# Patient Record
Sex: MALE | Race: WHITE
[De-identification: names, ages, dates, MRNs, and addresses within clinical notes are randomized per-mention and may not be internally consistent; named-entity substitution may affect disease eponyms.]

---

## 2021-04-30 PROBLEM — Z00.00 ENCOUNTER FOR PREVENTIVE HEALTH EXAMINATION: Status: ACTIVE | Noted: 2021-04-30

## 2021-05-07 DIAGNOSIS — Z83.3 FAMILY HISTORY OF DIABETES MELLITUS: ICD-10-CM

## 2021-05-07 DIAGNOSIS — H90.5 UNSPECIFIED SENSORINEURAL HEARING LOSS: ICD-10-CM

## 2021-05-07 DIAGNOSIS — Z82.0 FAMILY HISTORY OF EPILEPSY AND OTHER DISEASES OF THE NERVOUS SYSTEM: ICD-10-CM

## 2021-05-07 DIAGNOSIS — Z86.39 PERSONAL HISTORY OF OTHER ENDOCRINE, NUTRITIONAL AND METABOLIC DISEASE: ICD-10-CM

## 2021-05-07 DIAGNOSIS — Z86.2 PERSONAL HISTORY OF DISEASES OF THE BLOOD AND BLOOD-FORMING ORGANS AND CERTAIN DISORDERS INVOLVING THE IMMUNE MECHANISM: ICD-10-CM

## 2021-05-07 DIAGNOSIS — Z82.49 FAMILY HISTORY OF ISCHEMIC HEART DISEASE AND OTHER DISEASES OF THE CIRCULATORY SYSTEM: ICD-10-CM

## 2021-05-07 DIAGNOSIS — Z86.79 PERSONAL HISTORY OF OTHER DISEASES OF THE CIRCULATORY SYSTEM: ICD-10-CM

## 2021-05-07 DIAGNOSIS — Z86.010 PERSONAL HISTORY OF COLONIC POLYPS: ICD-10-CM

## 2021-05-07 RX ORDER — METFORMIN HYDROCHLORIDE 500 MG/1
500 TABLET, FILM COATED ORAL
Refills: 0 | Status: ACTIVE | COMMUNITY

## 2021-05-07 RX ORDER — RAMIPRIL 10 MG/1
10 CAPSULE ORAL
Refills: 0 | Status: ACTIVE | COMMUNITY

## 2021-05-10 ENCOUNTER — APPOINTMENT (OUTPATIENT)
Dept: HEMATOLOGY ONCOLOGY | Facility: CLINIC | Age: 66
End: 2021-05-10
Payer: COMMERCIAL

## 2021-05-10 VITALS
HEIGHT: 72 IN | TEMPERATURE: 97.8 F | HEART RATE: 66 BPM | SYSTOLIC BLOOD PRESSURE: 150 MMHG | BODY MASS INDEX: 28.31 KG/M2 | DIASTOLIC BLOOD PRESSURE: 82 MMHG | WEIGHT: 209 LBS | RESPIRATION RATE: 18 BRPM | OXYGEN SATURATION: 96 %

## 2021-05-10 DIAGNOSIS — Z78.9 OTHER SPECIFIED HEALTH STATUS: ICD-10-CM

## 2021-05-10 DIAGNOSIS — Z85.46 PERSONAL HISTORY OF MALIGNANT NEOPLASM OF PROSTATE: ICD-10-CM

## 2021-05-10 DIAGNOSIS — Z72.89 OTHER PROBLEMS RELATED TO LIFESTYLE: ICD-10-CM

## 2021-05-10 DIAGNOSIS — Z87.891 PERSONAL HISTORY OF NICOTINE DEPENDENCE: ICD-10-CM

## 2021-05-10 PROCEDURE — 99205 OFFICE O/P NEW HI 60 MIN: CPT

## 2021-05-10 PROCEDURE — 99072 ADDL SUPL MATRL&STAF TM PHE: CPT

## 2021-05-10 NOTE — HISTORY OF PRESENT ILLNESS
[de-identified] : Mr. Shafer is a 65 year old male who is referred for initial consultation for thrombocytopenia. \par He was first told that his platelets may have been borderline based on blood work from 2007.  Platelet counts in Jan of 2021 were 143,000 and in April 136,000.  He deneis bleeding or easy bruising.  He has occasional episodes of feeling lightheaded and nauseas.   [0 - No Distress] : Distress Level: 0

## 2021-05-10 NOTE — ASSESSMENT
[FreeTextEntry1] : This appears to be a relatively chronic and progressive finding.\par He reports that blood work dating back to 2007 may have revealed a similar finding.\par I will obtain a CBC with differential and a peripheral blood smear review.\par I will obtain a complete hematological workup to include a CMP, LDH, haptoglobin, reticulocyte count, B12/MMA/folic acid levels, TSH, SPEP, SIEP, IgQ,serum free light chains, EDI,  RF, panel, ferritin level, and a Heather' test.\par At this time he appears to be asymptomatic without signs of bleeding or easy bruising.\par I am requesting records from his urologist regarding the status and treatment of his prostate cancer.\par The patient will return in 2-3 weeks for followup, review of the above workup, and further recommendations.\par \par Thank you very much for allowing me to participate in the care of this patient. Should you have any questions or concerns please do not hesitate to call me directly.

## 2021-06-03 ENCOUNTER — APPOINTMENT (OUTPATIENT)
Dept: HEMATOLOGY ONCOLOGY | Facility: CLINIC | Age: 66
End: 2021-06-03
Payer: COMMERCIAL

## 2021-06-03 VITALS
RESPIRATION RATE: 18 BRPM | OXYGEN SATURATION: 98 % | SYSTOLIC BLOOD PRESSURE: 125 MMHG | TEMPERATURE: 98.3 F | DIASTOLIC BLOOD PRESSURE: 82 MMHG | HEART RATE: 57 BPM

## 2021-06-03 PROCEDURE — 99214 OFFICE O/P EST MOD 30 MIN: CPT

## 2021-06-03 PROCEDURE — 99072 ADDL SUPL MATRL&STAF TM PHE: CPT

## 2021-06-03 NOTE — HISTORY OF PRESENT ILLNESS
[0 - No Distress] : Distress Level: 0 [de-identified] : Mr. Shafer is a 65 year old male who is referred for initial consultation for thrombocytopenia. \par He was first told that his platelets may have been borderline based on blood work from 2007.  Platelet counts in Jan of 2021 were 143,000 and in April 136,000.  He deneis bleeding or easy bruising.  He has occasional episodes of feeling lightheaded and nauseas.   [de-identified] : Offers no new complaints.

## 2021-06-03 NOTE — ASSESSMENT
[FreeTextEntry1] : This appears to be a relatively chronic and progressive finding.\par He reports that blood work dating back to 2007 may have revealed a similar finding.\par I obtained a CBC with differential and a peripheral blood smear review.\par I obtained a complete hematological workup that included a CMP, LDH, haptoglobin, reticulocyte count, B12/MMA/folic acid levels, TSH, SPEP, SIEP, IgQ,serum free light chains, EDI,  RF, panel, ferritin level, and a Heather' test.  This was mostly unremarkable except for a +EDI and a low level monoclonal IgG lambda protein.  At this time I do not see evidence of end organ dysfunction (CRAB symptoms) related to this protein.\par At this time he appears to be asymptomatic without signs of bleeding or easy bruising.\par I am requesting records from his urologist regarding the status and treatment of his prostate cancer.\par I am referring him to Rheum for an evaluation.\par He will return in 2 months for f/u and repeat labs.

## 2021-08-12 ENCOUNTER — APPOINTMENT (OUTPATIENT)
Dept: HEMATOLOGY ONCOLOGY | Facility: CLINIC | Age: 66
End: 2021-08-12
Payer: COMMERCIAL

## 2021-08-12 VITALS
HEIGHT: 72 IN | HEART RATE: 74 BPM | OXYGEN SATURATION: 98 % | BODY MASS INDEX: 26.28 KG/M2 | WEIGHT: 194 LBS | TEMPERATURE: 98 F | DIASTOLIC BLOOD PRESSURE: 72 MMHG | SYSTOLIC BLOOD PRESSURE: 110 MMHG | RESPIRATION RATE: 18 BRPM

## 2021-08-12 PROCEDURE — 99214 OFFICE O/P EST MOD 30 MIN: CPT

## 2021-08-24 LAB
ALBUMIN MFR SERPL ELPH: 60.9 %
ALBUMIN SERPL-MCNC: 3.8 G/DL
ALBUMIN/GLOB SERPL: 1.5 RATIO
ALPHA1 GLOB MFR SERPL ELPH: 3.5 %
ALPHA1 GLOB SERPL ELPH-MCNC: 0.2 G/DL
ALPHA2 GLOB MFR SERPL ELPH: 12 %
ALPHA2 GLOB SERPL ELPH-MCNC: 0.8 G/DL
B-GLOBULIN MFR SERPL ELPH: 11.4 %
B-GLOBULIN SERPL ELPH-MCNC: 0.7 G/DL
DEPRECATED KAPPA LC FREE/LAMBDA SER: 0.39 RATIO
DEPRECATED KAPPA LC FREE/LAMBDA SER: 0.39 RATIO
GAMMA GLOB FLD ELPH-MCNC: 0.8 G/DL
GAMMA GLOB MFR SERPL ELPH: 12.2 %
IGA SER QL IEP: 195 MG/DL
IGG SER QL IEP: 852 MG/DL
IGM SER QL IEP: 29 MG/DL
INTERPRETATION SERPL IEP-IMP: NORMAL
KAPPA LC CSF-MCNC: 2.75 MG/DL
KAPPA LC CSF-MCNC: 2.75 MG/DL
KAPPA LC SERPL-MCNC: 1.07 MG/DL
KAPPA LC SERPL-MCNC: 1.07 MG/DL
M PROTEIN MFR SERPL ELPH: 2.8 %
M PROTEIN SPEC IFE-MCNC: NORMAL
MONOCLON BAND OBS SERPL: 0.2 G/DL
PROT SERPL-MCNC: 6.3 G/DL
PROT SERPL-MCNC: 6.3 G/DL

## 2021-08-24 NOTE — HISTORY OF PRESENT ILLNESS
[0 - No Distress] : Distress Level: 0 [de-identified] : Mr. Shafer is a 65 year old male who is referred for initial consultation for thrombocytopenia. \par He was first told that his platelets may have been borderline based on blood work from 2007.  Platelet counts in Jan of 2021 were 143,000 and in April 136,000.  He deneis bleeding or easy bruising.  He has occasional episodes of feeling lightheaded and nauseas.   [de-identified] : He presents for f/up of mild thrombocytopenia and MGUS. he was recently diagnosed with thyroid cancer and will be  going to see  for thyroidectomy. He is unsure of the histology.  His PSA has also been elevated and he underwent prostate biopsy last year with one pos core, recommended observation with repeat. He denies rash, fever, infections, bleeding, bruising, nausea,vomiting,cough, SOB, chest pain, change in BM, headache or dizziness.

## 2021-08-24 NOTE — ASSESSMENT
[FreeTextEntry1] : This appears to be a relatively chronic and progressive finding.\par He reports that blood work dating back to 2007 may have revealed a similar finding.\par I obtained a CBC with differential and a peripheral blood smear review.\par I obtained a complete hematological workup that included a CMP, LDH, haptoglobin, reticulocyte count, B12/MMA/folic acid levels, TSH, SPEP, SIEP, IgQ,serum free light chains, EDI,  RF, panel, ferritin level, and a Heather' test.  This was mostly unremarkable except for a +EDI and a low level monoclonal IgG lambda protein.  At this time I do not see evidence of end organ dysfunction (CRAB symptoms) related to this protein.\par At this time he appears to be asymptomatic without signs of bleeding or easy bruising.\par Requested records from his urologist regarding the status and treatment of his prostate cancer.\par Will request results of thyroidectomy\par He continues to have mild thrombocytopenia without bleeding or bruising and an IgG Lambda MGUS. Otherwise his labs are normal.\par Continue routine, age-appropriate, healthcare maintenance \par History of present illness, review of systems, physical exam and treatment plan reviewed with .\par Office visit in 12 weeks or prn for new or worsening symptoms.

## 2021-11-17 ENCOUNTER — APPOINTMENT (OUTPATIENT)
Dept: HEMATOLOGY ONCOLOGY | Facility: CLINIC | Age: 66
End: 2021-11-17
Payer: COMMERCIAL

## 2021-11-17 VITALS
DIASTOLIC BLOOD PRESSURE: 78 MMHG | OXYGEN SATURATION: 98 % | RESPIRATION RATE: 18 BRPM | TEMPERATURE: 99.1 F | SYSTOLIC BLOOD PRESSURE: 145 MMHG | HEIGHT: 72 IN | HEART RATE: 68 BPM | WEIGHT: 190 LBS | BODY MASS INDEX: 25.73 KG/M2

## 2021-11-17 PROCEDURE — 99214 OFFICE O/P EST MOD 30 MIN: CPT

## 2021-11-17 NOTE — ASSESSMENT
[FreeTextEntry1] : This appears to be a relatively chronic and progressive finding.\par He reports that blood work dating back to 2007 may have revealed a similar finding.\par I obtained a CBC with differential and a peripheral blood smear review.\par I obtained a complete hematological workup that included a CMP, LDH, haptoglobin, reticulocyte count, B12/MMA/folic acid levels, TSH, SPEP, SIEP, IgQ,serum free light chains, EDI,  RF, panel, ferritin level, and a Heather' test.  This was mostly unremarkable except for a +EDI and a low level monoclonal IgG lambda protein.  At this time I do not see evidence of end organ dysfunction (CRAB symptoms) related to this protein.\par At this time he appears to be asymptomatic without signs of bleeding or easy bruising.\par I am requesting records from the time of his thyroid surgery and from his recent prostate biopsy.  He knows to continue to follow-up with Torrie Borjas, Peter, and Leonid.\par He was found to have mild thrombocytopenia without bleeding or bruising and an IgG Lambda MGUS. Otherwise his labs are normal.\par Continue routine, age-appropriate, healthcare maintenance \par Office visit in 12 weeks or prn for new or worsening symptoms.

## 2021-11-17 NOTE — HISTORY OF PRESENT ILLNESS
[de-identified] : Mr. Shafer is a 66 year old male who is referred for initial consultation for thrombocytopenia. \par He was first told that his platelets may have been borderline based on blood work from 2007.  Platelet counts in Jan of 2021 were 143,000 and in April 136,000.  He deneis bleeding or easy bruising.  He has occasional episodes of feeling lightheaded and nauseas.   [de-identified] : He presents for f/up of mild thrombocytopenia and MGUS. he was recently diagnosed with thyroid cancer and underwent a thyroidectomy .  He reports that he has been told by Dr. Borjas and Dr. Geren that he does not require CADET therapy.  His PSA has also been elevated and he underwent prostate biopsy last week with the results not available this time. He denies rash, fever, infections, bleeding, bruising, nausea,vomiting,cough, SOB, chest pain, change in BM, headache or dizziness.

## 2022-01-14 LAB — PSA SERPL-MCNC: 24.9 NG/ML

## 2022-01-20 ENCOUNTER — APPOINTMENT (OUTPATIENT)
Dept: HEMATOLOGY ONCOLOGY | Facility: CLINIC | Age: 67
End: 2022-01-20
Payer: COMMERCIAL

## 2022-01-24 ENCOUNTER — APPOINTMENT (OUTPATIENT)
Dept: HEMATOLOGY ONCOLOGY | Facility: CLINIC | Age: 67
End: 2022-01-24
Payer: COMMERCIAL

## 2022-01-24 VITALS
TEMPERATURE: 98.6 F | HEIGHT: 72 IN | OXYGEN SATURATION: 96 % | HEART RATE: 78 BPM | WEIGHT: 192 LBS | RESPIRATION RATE: 18 BRPM | DIASTOLIC BLOOD PRESSURE: 68 MMHG | BODY MASS INDEX: 26.01 KG/M2 | SYSTOLIC BLOOD PRESSURE: 126 MMHG

## 2022-01-24 PROCEDURE — 99214 OFFICE O/P EST MOD 30 MIN: CPT

## 2022-01-24 NOTE — HISTORY OF PRESENT ILLNESS
[de-identified] : Mr. Shafer is a 66 year old male who is referred for initial consultation for thrombocytopenia. \par He was first told that his platelets may have been borderline based on blood work from 2007.  Platelet counts in Jan of 2021 were 143,000 and in April 136,000.  He deneis bleeding or easy bruising.  He has occasional episodes of feeling lightheaded and nauseas.   [de-identified] : He presents for f/up of mild thrombocytopenia and MGUS. he was recently diagnosed with thyroid cancer and underwent a thyroidectomy .  He reports that he has been told by Dr. Borjas and Dr. Green that he does not require CADET therapy.  His PSA has also been elevated and he underwent prostate biopsy that revealed a prostate adenocarcinoma.  He underwent a radical prostatectomy on 1/4/2022 and unfortunately developed a DVT and a saddle embolus for which he was transferred to [facility].  He was treated symptomatically with Eliquis and reports having improved back to baseline.

## 2022-01-24 NOTE — ASSESSMENT
[FreeTextEntry1] : This appears to be a relatively chronic and progressive finding.\par He reports that blood work dating back to 2007 may have revealed a similar finding.\par I obtained a CBC with differential and a peripheral blood smear review.\par I obtained a complete hematological workup that included a CMP, LDH, haptoglobin, reticulocyte count, B12/MMA/folic acid levels, TSH, SPEP, SIEP, IgQ,serum free light chains, EDI,  RF, panel, ferritin level, and a Heather' test.  This was mostly unremarkable except for a +EDI and a low level monoclonal IgG lambda protein.  At this time I do not see evidence of end organ dysfunction (CRAB symptoms) related to this protein.\par At this time he appears to be asymptomatic without signs of bleeding or easy bruising.\par I am requesting records from the time of his radical prostatectomy including operative notes and pathology reports.  I am also requesting records from his hospitalization at [facility] for a DVT and saddle embolus.  He appears to be doing clinically well and continues on Eliquis.  Given the severity of this event I will obtain a complete hematological work-up for thrombophilia.  He will require a repeat CT angiography of the chest at some point later in his anticoagulation, likely closer to 6 months.\par Continue routine, age-appropriate, healthcare maintenance \par Office visit in 12 weeks or prn for new or worsening symptoms.

## 2022-02-14 ENCOUNTER — APPOINTMENT (OUTPATIENT)
Dept: HEMATOLOGY ONCOLOGY | Facility: CLINIC | Age: 67
End: 2022-02-14
Payer: COMMERCIAL

## 2022-02-14 VITALS
SYSTOLIC BLOOD PRESSURE: 139 MMHG | WEIGHT: 187 LBS | BODY MASS INDEX: 25.33 KG/M2 | TEMPERATURE: 98.4 F | DIASTOLIC BLOOD PRESSURE: 96 MMHG | HEIGHT: 72 IN | HEART RATE: 72 BPM | RESPIRATION RATE: 18 BRPM | OXYGEN SATURATION: 98 %

## 2022-02-14 PROCEDURE — 99214 OFFICE O/P EST MOD 30 MIN: CPT | Mod: 25

## 2022-02-14 PROCEDURE — 36415 COLL VENOUS BLD VENIPUNCTURE: CPT

## 2022-02-14 NOTE — HISTORY OF PRESENT ILLNESS
[0 - No Distress] : Distress Level: 0 [de-identified] : Mr. Shafer is a 66 year old male who is referred for initial consultation for thrombocytopenia. \par He was first told that his platelets may have been borderline based on blood work from 2007.  Platelet counts in Jan of 2021 were 143,000 and in April 136,000.  He deneis bleeding or easy bruising.  He has occasional episodes of feeling lightheaded and nauseas.   [de-identified] : He presents for f/up of mild thrombocytopenia, MGUS,thyroid and prostate cancers and saddle embolus.  He continues on Eliquis and presents to review thrombophilia work-up which was notable only for decreased protein S activity of 53% which may be due to consumption.  He also has a mild lambda MGUS.  Platelet count had normalized to 204,000 at last visit on January 24, 2022.  He was recently diagnosed with thyroid cancer and underwent a thyroidectomy with .  He reports that he has been told by Dr. Borjas and Dr. Green that he does not require CADET therapy.  His PSA has also been elevated and he underwent prostate biopsy that revealed a prostate adenocarcinoma.  He underwent a radical prostatectomy on 1/4/2022 and unfortunately developed a DVT and a saddle embolus for which he was transferred to [facility].  He was treated symptomatically with Eliquis and reports having improved back to baseline.

## 2022-02-14 NOTE — ASSESSMENT
[FreeTextEntry1] : This appears to be a relatively chronic and progressive finding.\par He reports that blood work dating back to 2007 may have revealed a similar finding.\par I obtained a CBC with differential and a peripheral blood smear review.\par I obtained a complete hematological workup that included a CMP, LDH, haptoglobin, reticulocyte count, B12/MMA/folic acid levels, TSH, SPEP, SIEP, IgQ,serum free light chains, EDI,  RF, panel, ferritin level, and a Heather' test.  This was mostly unremarkable except for a +EDI and a low level monoclonal IgG lambda protein.  At this time I do not see evidence of end organ dysfunction (CRAB symptoms) related to this protein.\par At this time he appears to be asymptomatic without signs of bleeding or easy bruising.\par I am requesting records from the time of his radical prostatectomy including operative notes and pathology reports.  I am also requesting records from his hospitalization at [facility] for a DVT and saddle embolus.  He appears to be doing clinically well and continues on Eliquis.  Given the severity of this event I will obtain a complete hematological work-up for thrombophilia.  He will require a repeat CT angiography of the chest at some point later in his anticoagulation, likely closer to 6 months.\par Thrombophilia work-up  was notable only for decreased protein S activity of 53% which may be due to consumption.  He also has a mild lambda MGUS.  Platelet count had normalized to 204,000\par Repeat CT angiography of the chest in July 2022.\par Continue Eliquis\par Continue routine, age-appropriate, healthcare maintenance \par History of present illness, review of systems, physical exam and treatment plan reviewed with .\par Office visit in 12 weeks or prn for new or worsening symptoms.

## 2022-04-21 ENCOUNTER — APPOINTMENT (OUTPATIENT)
Dept: HEMATOLOGY ONCOLOGY | Facility: CLINIC | Age: 67
End: 2022-04-21

## 2022-05-12 ENCOUNTER — LABORATORY RESULT (OUTPATIENT)
Age: 67
End: 2022-05-12

## 2022-05-12 ENCOUNTER — APPOINTMENT (OUTPATIENT)
Dept: HEMATOLOGY ONCOLOGY | Facility: CLINIC | Age: 67
End: 2022-05-12
Payer: COMMERCIAL

## 2022-05-12 VITALS
RESPIRATION RATE: 18 BRPM | SYSTOLIC BLOOD PRESSURE: 118 MMHG | DIASTOLIC BLOOD PRESSURE: 71 MMHG | HEART RATE: 56 BPM | BODY MASS INDEX: 26.82 KG/M2 | OXYGEN SATURATION: 98 % | WEIGHT: 198 LBS | TEMPERATURE: 98.2 F | HEIGHT: 72 IN

## 2022-05-12 LAB
ALBUMIN MFR SERPL ELPH: 58 %
ALBUMIN SERPL-MCNC: 3.7 G/DL
ALBUMIN/GLOB SERPL: 1.4 RATIO
ALPHA1 GLOB MFR SERPL ELPH: 3.9 %
ALPHA1 GLOB SERPL ELPH-MCNC: 0.2 G/DL
ALPHA2 GLOB MFR SERPL ELPH: 12.8 %
ALPHA2 GLOB SERPL ELPH-MCNC: 0.8 G/DL
APTT BLD: 29.6 SEC
AT III PPP CHRO-ACNC: 97 %
B-GLOBULIN MFR SERPL ELPH: 11.7 %
B-GLOBULIN SERPL ELPH-MCNC: 0.7 G/DL
B2 GLYCOPROT1 IGG SER-ACNC: <5 SGU
B2 GLYCOPROT1 IGM SER-ACNC: <5 SMU
CARDIOLIPIN AB SER IA-ACNC: NEGATIVE
CARDIOLIPIN IGM SER-MCNC: 11.5 MPL
CARDIOLIPIN IGM SER-MCNC: 5 GPL
DEPRECATED KAPPA LC FREE/LAMBDA SER: 0.53 RATIO
DEPRECATED KAPPA LC FREE/LAMBDA SER: 0.53 RATIO
DNA PLOIDY SPEC FC-IMP: NORMAL
GAMMA GLOB FLD ELPH-MCNC: 0.9 G/DL
GAMMA GLOB MFR SERPL ELPH: 13.6 %
IGA SER QL IEP: 206 MG/DL
IGG SER QL IEP: 974 MG/DL
IGM SER QL IEP: 64 MG/DL
INTERPRETATION SERPL IEP-IMP: NORMAL
KAPPA LC CSF-MCNC: 3.66 MG/DL
KAPPA LC CSF-MCNC: 3.66 MG/DL
KAPPA LC SERPL-MCNC: 1.93 MG/DL
KAPPA LC SERPL-MCNC: 1.93 MG/DL
M PROTEIN MFR SERPL ELPH: 2.2 %
M PROTEIN SPEC IFE-MCNC: NORMAL
MONOCLON BAND OBS SERPL: 0.1 G/DL
PROT C PPP CHRO-ACNC: 119 %
PROT S FREE PPP-ACNC: 53 %
PROT SERPL-MCNC: 6.4 G/DL
PROT SERPL-MCNC: 6.4 G/DL
PSA SERPL-MCNC: 0.12 NG/ML
PTR INTERP: NORMAL

## 2022-05-12 PROCEDURE — 99214 OFFICE O/P EST MOD 30 MIN: CPT

## 2022-05-12 NOTE — HISTORY OF PRESENT ILLNESS
[de-identified] : Mr. Shafer is a 66 year old male who is referred for initial consultation for thrombocytopenia. \par He was first told that his platelets may have been borderline based on blood work from 2007.  Platelet counts in Jan of 2021 were 143,000 and in April 136,000.  He deneis bleeding or easy bruising.  He has occasional episodes of feeling lightheaded and nauseas.   [de-identified] : He presents for f/up of mild thrombocytopenia, MGUS,thyroid and prostate cancers and saddle embolus.  He continues on Eliquis and presents to review thrombophilia work-up which was notable only for decreased protein S activity of 53% which may be due to consumption.  He also has a mild lambda MGUS.  Platelet count had normalized to 204,000 at last visit on January 24, 2022.  He was recently diagnosed with thyroid cancer and underwent a thyroidectomy with .  He reports that he has been told by Dr. Borjas and Dr. Green that he does not require CADET therapy.  His PSA has also been elevated and he underwent prostate biopsy that revealed a prostate adenocarcinoma.  He underwent a radical prostatectomy on 1/4/2022 and unfortunately developed a DVT and a saddle embolus for which he was transferred to [facility].  He was treated symptomatically with Eliquis and reports having improved back to baseline.

## 2022-05-12 NOTE — ASSESSMENT
[FreeTextEntry1] : This appears to be a relatively chronic and progressive finding.\par He reports that blood work dating back to 2007 may have revealed a similar finding.\par I obtained a CBC with differential and a peripheral blood smear review.\par I obtained a complete hematological workup that included a CMP, LDH, haptoglobin, reticulocyte count, B12/MMA/folic acid levels, TSH, SPEP, SIEP, IgQ,serum free light chains, EDI,  RF, panel, ferritin level, and a Heather' test.  This was mostly unremarkable except for a +EDI and a low level monoclonal IgG lambda protein.  At this time I do not see evidence of end organ dysfunction (CRAB symptoms) related to this protein.\par At this time he appears to be asymptomatic without signs of bleeding or easy bruising.\par I am requesting records from the time of his radical prostatectomy including operative notes and pathology reports.  I am also requesting records from his hospitalization at [facility] for a DVT and saddle embolus.  He appears to be doing clinically well and continues on Eliquis.  Given the severity of this event I will obtain a complete hematological work-up for thrombophilia.  He will require a repeat CT angiography of the chest at some point later in his anticoagulation, likely closer to 6 months.\par Thrombophilia work-up  was notable only for decreased protein S activity of 53% which may be due to consumption.  He also has a mild lambda MGUS.  Platelet count had normalized to 204,000\par Repeat CT angiography of the chest in July 2022.\par Continue Eliquis\par Continue routine, age-appropriate, healthcare maintenance \par Office visit in 12 weeks or prn for new or worsening symptoms.

## 2022-08-24 ENCOUNTER — APPOINTMENT (OUTPATIENT)
Dept: HEMATOLOGY ONCOLOGY | Facility: CLINIC | Age: 67
End: 2022-08-24

## 2022-08-24 ENCOUNTER — RESULT REVIEW (OUTPATIENT)
Age: 67
End: 2022-08-24

## 2022-08-24 VITALS
SYSTOLIC BLOOD PRESSURE: 124 MMHG | HEIGHT: 72 IN | HEART RATE: 65 BPM | TEMPERATURE: 97.8 F | RESPIRATION RATE: 18 BRPM | DIASTOLIC BLOOD PRESSURE: 76 MMHG | OXYGEN SATURATION: 98 % | BODY MASS INDEX: 26.28 KG/M2 | WEIGHT: 194 LBS

## 2022-08-24 PROCEDURE — 99214 OFFICE O/P EST MOD 30 MIN: CPT

## 2022-08-26 LAB
ALBUMIN MFR SERPL ELPH: 62.1 %
ALBUMIN MFR SERPL ELPH: 62.1 %
ALBUMIN MFR SERPL ELPH: 62.3 %
ALBUMIN SERPL-MCNC: 3.9 G/DL
ALBUMIN SERPL-MCNC: 4.2 G/DL
ALBUMIN SERPL-MCNC: 4.4 G/DL
ALBUMIN/GLOB SERPL: 1.7 RATIO
ALPHA1 GLOB MFR SERPL ELPH: 3.3 %
ALPHA1 GLOB MFR SERPL ELPH: 3.4 %
ALPHA1 GLOB MFR SERPL ELPH: 3.6 %
ALPHA1 GLOB SERPL ELPH-MCNC: 0.2 G/DL
ALPHA2 GLOB MFR SERPL ELPH: 10.9 %
ALPHA2 GLOB MFR SERPL ELPH: 11 %
ALPHA2 GLOB MFR SERPL ELPH: 11.1 %
ALPHA2 GLOB SERPL ELPH-MCNC: 0.7 G/DL
ALPHA2 GLOB SERPL ELPH-MCNC: 0.7 G/DL
ALPHA2 GLOB SERPL ELPH-MCNC: 0.8 G/DL
ANA PAT FLD IF-IMP: NORMAL
ANA SER IF-ACNC: ABNORMAL
B-GLOBULIN MFR SERPL ELPH: 11.2 %
B-GLOBULIN MFR SERPL ELPH: 11.2 %
B-GLOBULIN MFR SERPL ELPH: 11.7 %
B-GLOBULIN SERPL ELPH-MCNC: 0.7 G/DL
B-GLOBULIN SERPL ELPH-MCNC: 0.8 G/DL
B-GLOBULIN SERPL ELPH-MCNC: 0.8 G/DL
DEPRECATED KAPPA LC FREE/LAMBDA SER: 0.45 RATIO
DEPRECATED KAPPA LC FREE/LAMBDA SER: 0.45 RATIO
DEPRECATED KAPPA LC FREE/LAMBDA SER: 0.46 RATIO
DEPRECATED KAPPA LC FREE/LAMBDA SER: 0.53 RATIO
DEPRECATED KAPPA LC FREE/LAMBDA SER: 0.53 RATIO
ERYTHROCYTE [SEDIMENTATION RATE] IN BLOOD BY WESTERGREN METHOD: 18 MM/HR
FERRITIN SERPL-MCNC: 235 NG/ML
FERRITIN SERPL-MCNC: 394 NG/ML
FOLATE SERPL-MCNC: 10.6 NG/ML
FOLATE SERPL-MCNC: 6.9 NG/ML
GAMMA GLOB FLD ELPH-MCNC: 0.8 G/DL
GAMMA GLOB FLD ELPH-MCNC: 0.8 G/DL
GAMMA GLOB FLD ELPH-MCNC: 0.9 G/DL
GAMMA GLOB MFR SERPL ELPH: 11.7 %
GAMMA GLOB MFR SERPL ELPH: 12.2 %
GAMMA GLOB MFR SERPL ELPH: 12.2 %
HAPTOGLOB SERPL-MCNC: 152 MG/DL
HAPTOGLOB SERPL-MCNC: 164 MG/DL
IGA SER QL IEP: 161 MG/DL
IGA SER QL IEP: 172 MG/DL
IGA SER QL IEP: 173 MG/DL
IGA SER QL IEP: 194 MG/DL
IGG SER QL IEP: 805 MG/DL
IGG SER QL IEP: 849 MG/DL
IGG SER QL IEP: 911 MG/DL
IGG SER QL IEP: 912 MG/DL
IGM SER QL IEP: 31 MG/DL
IGM SER QL IEP: 31 MG/DL
IGM SER QL IEP: 36 MG/DL
IGM SER QL IEP: 95 MG/DL
INTERPRETATION SERPL IEP-IMP: NORMAL
IRON SATN MFR SERPL: 31 %
IRON SATN MFR SERPL: 35 %
IRON SERPL-MCNC: 101 UG/DL
IRON SERPL-MCNC: 105 UG/DL
KAPPA LC CSF-MCNC: 2.94 MG/DL
KAPPA LC CSF-MCNC: 2.94 MG/DL
KAPPA LC CSF-MCNC: 3.01 MG/DL
KAPPA LC CSF-MCNC: 3.01 MG/DL
KAPPA LC CSF-MCNC: 3.05 MG/DL
KAPPA LC CSF-MCNC: 3.05 MG/DL
KAPPA LC CSF-MCNC: 3.14 MG/DL
KAPPA LC CSF-MCNC: 3.14 MG/DL
KAPPA LC SERPL-MCNC: 1.32 MG/DL
KAPPA LC SERPL-MCNC: 1.32 MG/DL
KAPPA LC SERPL-MCNC: 1.39 MG/DL
KAPPA LC SERPL-MCNC: 1.39 MG/DL
KAPPA LC SERPL-MCNC: 1.43 MG/DL
KAPPA LC SERPL-MCNC: 1.43 MG/DL
KAPPA LC SERPL-MCNC: 1.62 MG/DL
KAPPA LC SERPL-MCNC: 1.62 MG/DL
M PROTEIN MFR SERPL ELPH: 1.5 %
M PROTEIN MFR SERPL ELPH: 2.5 %
M PROTEIN MFR SERPL ELPH: 2.7 %
M PROTEIN SPEC IFE-MCNC: NORMAL
METHYLMALONATE SERPL-SCNC: 210 NMOL/L
MONOCLON BAND OBS SERPL: 0.1 G/DL
MONOCLON BAND OBS SERPL: 0.2 G/DL
MONOCLON BAND OBS SERPL: 0.2 G/DL
PROT S FREE PPP-ACNC: 61 %
PROT SERPL-MCNC: 6.2 G/DL
PROT SERPL-MCNC: 6.2 G/DL
PROT SERPL-MCNC: 6.7 G/DL
PROT SERPL-MCNC: 6.7 G/DL
PROT SERPL-MCNC: 7 G/DL
PROT SERPL-MCNC: 7 G/DL
RBC # BLD: 4.74 M/UL
RETICS # AUTO: 1.1 %
RETICS AGGREG/RBC NFR: 50.2 K/UL
RHEUMATOID FACT SER QL: <10 IU/ML
TIBC SERPL-MCNC: 288 UG/DL
TIBC SERPL-MCNC: 334 UG/DL
UIBC SERPL-MCNC: 187 UG/DL
UIBC SERPL-MCNC: 229 UG/DL
VIT B12 SERPL-MCNC: 300 PG/ML
VIT B12 SERPL-MCNC: 750 PG/ML

## 2022-08-26 NOTE — ASSESSMENT
[FreeTextEntry1] : This appears to be a relatively chronic and progressive finding.\par He reports that blood work dating back to 2007 may have revealed a similar finding.\par I obtained a CBC with differential and a peripheral blood smear review.\par I obtained a complete hematological workup that included a CMP, LDH, haptoglobin, reticulocyte count, B12/MMA/folic acid levels, TSH, SPEP, SIEP, IgQ,serum free light chains, EDI,  RF, panel, ferritin level, and a Heather' test.  This was mostly unremarkable except for a +EDI and a low level monoclonal IgG lambda protein.  At this time I do not see evidence of end organ dysfunction (CRAB symptoms) related to this protein.\par At this time he appears to be asymptomatic without signs of bleeding or easy bruising.\par I am requesting records from the time of his radical prostatectomy including operative notes and pathology reports. I am also requesting records from his hospitalization at [facility] for a DVT and saddle embolus.  He appears to be doing clinically well and continues on Eliquis.  Given the severity of this event I will obtain a complete hematological work-up for thrombophilia.  He will require a repeat CT angiography of the chest at some point later in his anticoagulation, likely closer to 6 months.\par Thrombophilia work-up  was notable only for decreased protein S activity of 53% which may be due to consumption.  He also has a mild lambda MGUS.  Platelet count had normalized to 204,000\par Continue Eliquis\par Continue routine, age-appropriate, healthcare maintenance \par Office visit in 12 weeks or prn for new or worsening symptoms.

## 2022-09-04 LAB
ALBUMIN MFR SERPL ELPH: 62.7 %
ALBUMIN SERPL-MCNC: 4.3 G/DL
ALBUMIN/GLOB SERPL: 1.7 RATIO
ALPHA1 GLOB MFR SERPL ELPH: 3.3 %
ALPHA1 GLOB SERPL ELPH-MCNC: 0.2 G/DL
ALPHA2 GLOB MFR SERPL ELPH: 10.6 %
ALPHA2 GLOB SERPL ELPH-MCNC: 0.7 G/DL
B-GLOBULIN MFR SERPL ELPH: 11.5 %
B-GLOBULIN SERPL ELPH-MCNC: 0.8 G/DL
GAMMA GLOB FLD ELPH-MCNC: 0.8 G/DL
GAMMA GLOB MFR SERPL ELPH: 11.9 %
INTERPRETATION SERPL IEP-IMP: NORMAL
M PROTEIN MFR SERPL ELPH: 2.8 %
M PROTEIN SPEC IFE-MCNC: NORMAL
METHYLMALONATE SERPL-SCNC: 183 NMOL/L
MONOCLON BAND OBS SERPL: 0.2 G/DL
PROT SERPL-MCNC: 6.8 G/DL
PROT SERPL-MCNC: 6.8 G/DL

## 2022-10-19 ENCOUNTER — APPOINTMENT (OUTPATIENT)
Dept: HEMATOLOGY ONCOLOGY | Facility: CLINIC | Age: 67
End: 2022-10-19

## 2022-10-19 ENCOUNTER — RESULT REVIEW (OUTPATIENT)
Age: 67
End: 2022-10-19

## 2022-10-19 VITALS
BODY MASS INDEX: 26.95 KG/M2 | TEMPERATURE: 98 F | OXYGEN SATURATION: 97 % | WEIGHT: 199 LBS | HEIGHT: 72 IN | DIASTOLIC BLOOD PRESSURE: 66 MMHG | SYSTOLIC BLOOD PRESSURE: 125 MMHG | RESPIRATION RATE: 18 BRPM | HEART RATE: 84 BPM

## 2022-10-19 PROCEDURE — 99214 OFFICE O/P EST MOD 30 MIN: CPT

## 2022-10-24 LAB
DEPRECATED KAPPA LC FREE/LAMBDA SER: 0.54 RATIO
DEPRECATED KAPPA LC FREE/LAMBDA SER: 0.54 RATIO
IGA SER QL IEP: 151 MG/DL
IGG SER QL IEP: 781 MG/DL
IGM SER QL IEP: 30 MG/DL
KAPPA LC CSF-MCNC: 2.95 MG/DL
KAPPA LC CSF-MCNC: 2.95 MG/DL
KAPPA LC SERPL-MCNC: 1.6 MG/DL
KAPPA LC SERPL-MCNC: 1.6 MG/DL
PROT S FREE PPP-ACNC: 49 %
PSA SERPL-MCNC: <0.01 NG/ML

## 2022-10-24 NOTE — ASSESSMENT
[FreeTextEntry1] : This appears to be a relatively chronic and progressive finding.\par He reports that blood work dating back to 2007 may have revealed a similar finding.\par I obtained a CBC with differential and a peripheral blood smear review.\par I obtained a complete hematological workup that included a CMP, LDH, haptoglobin, reticulocyte count, B12/MMA/folic acid levels, TSH, SPEP, SIEP, IgQ,serum free light chains, EDI,  RF, panel, ferritin level, and a Heather' test.  This was mostly unremarkable except for a +EDI and a low level monoclonal IgG lambda protein.  At this time I do not see evidence of end organ dysfunction (CRAB symptoms) related to this protein.\par At this time he appears to be asymptomatic without signs of bleeding or easy bruising.\par I am requesting records from the time of his radical prostatectomy including operative notes and pathology reports. I am also requesting records from his hospitalization at [facility] for a DVT and saddle embolus.  He appears to be doing clinically well and continues on Eliquis.  Given the severity of this event I will obtain a complete hematological work-up for thrombophilia.  He will require a repeat CT angiography of the chest at some point later in his anticoagulation, likely closer to 6 months.\par Thrombophilia work-up  was notable only for decreased protein S activity of 53% which may be due to consumption.  He also has a mild lambda MGUS.  \par Platelet count today is 136,000 without any bleeding or bruising\par In light of provoked clot, no thrombophilia except slightly diminished protein S activity, negative CT angio prostate and thyroid cancer is under control he was advised that he may discontinue Eliquis after he completes his current prescription.  He was given precautions regarding travel, leg pain and shortness of breath.\par Continue routine, age-appropriate, healthcare maintenance \par History of present illness, review of systems, physical exam and treatment plan reviewed with Dr. Sultana Crum\par Office visit in 12 weeks or prn for new or worsening symptoms.

## 2022-10-24 NOTE — HISTORY OF PRESENT ILLNESS
[0 - No Distress] : Distress Level: 0 [de-identified] : Mr. Shafer is a 66 year old male who is referred for initial consultation for thrombocytopenia. \par He was first told that his platelets may have been borderline based on blood work from 2007.  Platelet counts in Jan of 2021 were 143,000 and in April 136,000.  He deneis bleeding or easy bruising.  He has occasional episodes of feeling lightheaded and nauseas.   [de-identified] : He presents for f/up of mild thrombocytopenia, MGUS,thyroid and prostate cancers and saddle embolus in the setting of prostatectomy.  He continues on Eliquis 5 mg and thrombophilia work-up reveals only a slightly diminished protein S activity.  He was recently diagnosed with thyroid cancer and underwent a thyroidectomy with .  He reports that he has been told by Dr. Borjas and Dr. Green that he does not require CADET therapy.  His PSA has also been elevated and he underwent prostate biopsy that revealed a prostate adenocarcinoma.  He underwent a radical prostatectomy on 1/4/2022 and unfortunately developed a DVT and a saddle embolus for which he was transferred to [facility].  He was treated symptomatically with Eliquis and reports having improved back to baseline.

## 2023-02-23 ENCOUNTER — APPOINTMENT (OUTPATIENT)
Dept: HEMATOLOGY ONCOLOGY | Facility: CLINIC | Age: 68
End: 2023-02-23
Payer: COMMERCIAL

## 2023-02-23 ENCOUNTER — RESULT REVIEW (OUTPATIENT)
Age: 68
End: 2023-02-23

## 2023-02-23 VITALS
RESPIRATION RATE: 18 BRPM | HEART RATE: 55 BPM | DIASTOLIC BLOOD PRESSURE: 77 MMHG | HEIGHT: 72 IN | SYSTOLIC BLOOD PRESSURE: 132 MMHG | WEIGHT: 195 LBS | TEMPERATURE: 98.2 F | OXYGEN SATURATION: 99 % | BODY MASS INDEX: 26.41 KG/M2

## 2023-02-23 LAB — M PROTEIN SPEC IFE-MCNC: NORMAL

## 2023-02-23 PROCEDURE — 99214 OFFICE O/P EST MOD 30 MIN: CPT | Mod: 25

## 2023-02-23 PROCEDURE — 36415 COLL VENOUS BLD VENIPUNCTURE: CPT

## 2023-02-23 RX ORDER — IBUPROFEN 800 MG
TABLET ORAL
Refills: 0 | Status: COMPLETED | COMMUNITY
End: 2023-02-23

## 2023-02-23 RX ORDER — APIXABAN 5 MG/1
5 TABLET, FILM COATED ORAL
Refills: 0 | Status: COMPLETED | COMMUNITY
End: 2023-02-23

## 2023-02-23 RX ORDER — KRILL/OM-3/DHA/EPA/PHOSPHO/AST 1000-230MG
CAPSULE ORAL
Refills: 0 | Status: ACTIVE | COMMUNITY

## 2023-02-23 RX ORDER — ATORVASTATIN CALCIUM 20 MG/1
20 TABLET, FILM COATED ORAL
Refills: 0 | Status: ACTIVE | COMMUNITY

## 2023-02-23 RX ORDER — APIXABAN 5 MG/1
5 TABLET, FILM COATED ORAL
Qty: 189 | Refills: 0 | Status: COMPLETED | COMMUNITY
Start: 2022-02-14 | End: 2023-02-23

## 2023-02-23 NOTE — HISTORY OF PRESENT ILLNESS
[0 - No Distress] : Distress Level: 0 [de-identified] : Mr. Shafer is a 66 year old male who is referred for initial consultation for thrombocytopenia. \par He was first told that his platelets may have been borderline based on blood work from 2007.  Platelet counts in Jan of 2021 were 143,000 and in April 136,000.  \par He is folllowed for mild thrombocytopenia, MGUS,thyroid and prostate cancers and saddle embolus in the setting of prostatectomy.\par   He  underwent a thyroidectomy with  9/21\par PSA elevated and he underwent prostate biopsy that revealed a prostate adenocarcinoma.  He underwent a radical prostatectomy on 1/4/2022 and unfortunately developed a DVT and a saddle embolus for which he was transferred to [facility].  He did eliquis and stopped in 11/22 [de-identified] : He presents for f/up of mild thrombocytopenia, MGUS,thyroid and prostate cancers and saddle embolus in the setting of prostatectomy.\par   He  underwent a thyroidectomy with  9/21\par PSA elevated and he underwent prostate biopsy that revealed a prostate adenocarcinoma.  He underwent a radical prostatectomy on 1/4/2022 and unfortunately developed a DVT and a saddle embolus for which he was transferred to [facility].  He did eliquis and stopped in 11/22\par \par

## 2023-02-23 NOTE — ASSESSMENT
[FreeTextEntry1] : Mr. Shafer is a 66 year old male who is referred for initial consultation for thrombocytopenia. \par He was first told that his platelets may have been borderline based on blood work from 2007.  Platelet counts in Jan of 2021 were 143,000 and in April 136,000.  \par He is folllowed for mild thrombocytopenia, MGUS,thyroid and prostate cancers and saddle embolus in the setting of prostatectomy.\par   He  underwent a thyroidectomy with  9/21\par PSA elevated and he underwent prostate biopsy that revealed a prostate adenocarcinoma.  He underwent a radical prostatectomy on 1/4/2022 and unfortunately developed a DVT and a saddle embolus for which he was transferred to [facility].  He did eliquis and stopped in 11/22\par \par \par Plan:\par \par Prostate cancer: s/p radical prostatectomy 1/2022 --f/u with Dr. Jaquez for surveiilance\par Thyroid cancer s/p partial  thyroidectomy . f/u with Dr. Borjas\par throimbocytopenia : ? immune ? marrow pathology . Mild/stable\par Elevated monocytes 10% and absolute count > 500  -- if persistent > 3 months will need further w/u for MPD. ? reactive to + EDI\par MGUS: IgG lambda on MARIO/ gamma migrating paraprotein on SPEP/ Ratio 2.95-3.6. Consider skeletal imaging. Consider BMBX\par

## 2023-05-25 ENCOUNTER — RESULT REVIEW (OUTPATIENT)
Age: 68
End: 2023-05-25

## 2023-05-25 ENCOUNTER — APPOINTMENT (OUTPATIENT)
Dept: HEMATOLOGY ONCOLOGY | Facility: CLINIC | Age: 68
End: 2023-05-25
Payer: COMMERCIAL

## 2023-05-25 VITALS
WEIGHT: 209 LBS | OXYGEN SATURATION: 98 % | RESPIRATION RATE: 18 BRPM | SYSTOLIC BLOOD PRESSURE: 133 MMHG | DIASTOLIC BLOOD PRESSURE: 78 MMHG | TEMPERATURE: 97.9 F | BODY MASS INDEX: 28.31 KG/M2 | HEART RATE: 59 BPM | HEIGHT: 72 IN

## 2023-05-25 PROCEDURE — 99213 OFFICE O/P EST LOW 20 MIN: CPT

## 2023-05-30 LAB
DEPRECATED KAPPA LC FREE/LAMBDA SER: 0.53 RATIO
IGA SER QL IEP: 148 MG/DL
IGG SER QL IEP: 733 MG/DL
IGM SER QL IEP: 32 MG/DL
KAPPA LC CSF-MCNC: 3.17 MG/DL
KAPPA LC SERPL-MCNC: 1.69 MG/DL

## 2023-05-30 NOTE — ASSESSMENT
[FreeTextEntry1] : Mr. Shafer is a 66 year old male who is referred for initial consultation for thrombocytopenia. \par He was first told that his platelets may have been borderline based on blood work from 2007.  Platelet counts in Jan of 2021 were 143,000 and in April 136,000.  \par He is folllowed for mild thrombocytopenia, MGUS,thyroid and prostate cancers and saddle embolus in the setting of prostatectomy.\par   He  underwent a thyroidectomy with  9/21\par PSA elevated and he underwent prostate biopsy that revealed a prostate adenocarcinoma.  He underwent a radical prostatectomy on 1/4/2022 and unfortunately developed a DVT and a saddle embolus for which he was transferred to [facility].  He did eliquis and stopped in 11/22\par \par \par Plan:\par Prostate cancer: s/p radical prostatectomy 1/2022 --f/u with Dr. Jaquez for surveiilance\par Thyroid cancer s/p partial  thyroidectomy . f/u with Dr. Borjas\par thrombocytopenia : ? immune ? marrow pathology . Mild/stable\par Elevated monocytes 10% and absolute count > 500  -- if persistent > 3 months will need further w/u for MPD. ? reactive to + EDI\par MGUS: IgG lambda on MARIO/ gamma migrating paraprotein on SPEP/ Ratio 2.95-3.6. Skeletal imaging negative. Consider BMBX\par Continue routine, age-appropriate, healthcare maintenance \par History of present illness, review of systems, physical exam and treatment plan reviewed with Dr. Sultana Crum\par Office visit in 12 weeks or prn for new or worsening symptoms. \par

## 2023-05-30 NOTE — HISTORY OF PRESENT ILLNESS
[0 - No Distress] : Distress Level: 0 [de-identified] : Mr. Shafer is a 67 year old male who is referred for initial consultation for thrombocytopenia. \par He was first told that his platelets may have been borderline based on blood work from 2007.  Platelet counts in Jan of 2021 were 143,000 and in April 136,000.  \par He is folllowed for mild thrombocytopenia, MGUS,thyroid and prostate cancers and saddle embolus in the setting of prostatectomy.\par   He  underwent a thyroidectomy with  9/21\par PSA elevated and he underwent prostate biopsy that revealed a prostate adenocarcinoma.  He underwent a radical prostatectomy on 1/4/2022 and unfortunately developed a DVT and a saddle embolus for which he was transferred to [facility].  He did eliquis and stopped in 11/22 [de-identified] : He presents for f/up of mild thrombocytopenia, MGUS,thyroid and prostate cancers and saddle embolus in the setting of prostatectomy.\par He underwent skeletal survey since his last visit which was negative.  We are monitoring his paraproteins.\par  He  underwent a thyroidectomy with  9/21\par PSA elevated and he underwent prostate biopsy that revealed a prostate adenocarcinoma.  He underwent a radical prostatectomy on 1/4/2022 and unfortunately developed a DVT and a saddle embolus for which he was transferred to [facility].  He did eliquis and stopped in 11/22\par \par

## 2023-08-24 ENCOUNTER — RESULT REVIEW (OUTPATIENT)
Age: 68
End: 2023-08-24

## 2023-08-24 ENCOUNTER — APPOINTMENT (OUTPATIENT)
Dept: HEMATOLOGY ONCOLOGY | Facility: CLINIC | Age: 68
End: 2023-08-24
Payer: COMMERCIAL

## 2023-08-24 VITALS
OXYGEN SATURATION: 98 % | RESPIRATION RATE: 18 BRPM | BODY MASS INDEX: 26.95 KG/M2 | HEART RATE: 50 BPM | DIASTOLIC BLOOD PRESSURE: 63 MMHG | TEMPERATURE: 97.3 F | HEIGHT: 72 IN | SYSTOLIC BLOOD PRESSURE: 109 MMHG | WEIGHT: 199 LBS

## 2023-08-24 LAB
ALBUMIN MFR SERPL ELPH: 63.1 %
ALBUMIN SERPL-MCNC: 4 G/DL
ALBUMIN/GLOB SERPL: 1.7 RATIO
ALPHA1 GLOB MFR SERPL ELPH: 3.4 %
ALPHA1 GLOB SERPL ELPH-MCNC: 0.2 G/DL
ALPHA2 GLOB MFR SERPL ELPH: 10.3 %
ALPHA2 GLOB SERPL ELPH-MCNC: 0.7 G/DL
B-GLOBULIN MFR SERPL ELPH: 11.3 %
B-GLOBULIN SERPL ELPH-MCNC: 0.7 G/DL
DEPRECATED KAPPA LC FREE/LAMBDA SER: 0.57 RATIO
GAMMA GLOB FLD ELPH-MCNC: 0.8 G/DL
GAMMA GLOB MFR SERPL ELPH: 11.9 %
IGA SER QL IEP: 133 MG/DL
IGG SER QL IEP: 647 MG/DL
IGM SER QL IEP: 27 MG/DL
INTERPRETATION SERPL IEP-IMP: NORMAL
KAPPA LC CSF-MCNC: 2.76 MG/DL
KAPPA LC SERPL-MCNC: 1.57 MG/DL
M PROTEIN MFR SERPL ELPH: 2.4 %
M PROTEIN SPEC IFE-MCNC: NORMAL
MONOCLON BAND OBS SERPL: 0.2 G/DL
PROT SERPL-MCNC: 6.4 G/DL
PROT SERPL-MCNC: 6.4 G/DL

## 2023-08-24 PROCEDURE — 36415 COLL VENOUS BLD VENIPUNCTURE: CPT

## 2023-08-24 PROCEDURE — 99213 OFFICE O/P EST LOW 20 MIN: CPT | Mod: 25

## 2023-08-24 RX ORDER — ASCORBIC ACID 125 MG
TABLET,CHEWABLE ORAL
Refills: 0 | Status: ACTIVE | COMMUNITY

## 2023-08-24 NOTE — ASSESSMENT
[FreeTextEntry1] : Mr. Shafer is a 66 year old male who is referred for initial consultation for thrombocytopenia.  He was first told that his platelets may have been borderline based on blood work from 2007.  Platelet counts in Jan of 2021 were 143,000 and in April 136,000.   He is folllowed for mild thrombocytopenia, MGUS,thyroid and prostate cancers and saddle embolus in the setting of prostatectomy.   He  underwent a thyroidectomy with  9/21 PSA elevated and he underwent prostate biopsy that revealed a prostate adenocarcinoma.  He underwent a radical prostatectomy on 1/4/2022 and unfortunately developed a DVT and a saddle embolus for which he was transferred to [facility].  He did eliquis and stopped in 11/22   Plan:  Prostate cancer: s/p radical prostatectomy 1/2022 --f/u with Dr. Jaquez for surveiilance Thyroid cancer s/p partial  thyroidectomy . f/u with Dr. Borjas throimbocytopenia : ? immune ? marrow pathology . Mild/stable Elevated monocytes 10% and absolute count > 500  -- if persistent > 3 months will need further w/u for MPD. ? reactive to + EDI MGUS: IgG lambda on MARIO/ gamma migrating paraprotein on SPEP/ Ratio 2.95-3.6.3/23 negative skeletal imaging. Consider BMBX Cologuard negative. f/u for colonoscopy  RTC 3 months . Check protein studies next visit

## 2023-08-24 NOTE — HISTORY OF PRESENT ILLNESS
[0 - No Distress] : Distress Level: 0 [de-identified] : Mr. Shafer is a 66 year old male who is referred for initial consultation for thrombocytopenia. \par  He was first told that his platelets may have been borderline based on blood work from 2007.  Platelet counts in Jan of 2021 were 143,000 and in April 136,000.  \par  He is folllowed for mild thrombocytopenia, MGUS,thyroid and prostate cancers and saddle embolus in the setting of prostatectomy.\par    He  underwent a thyroidectomy with  9/21\par  PSA elevated and he underwent prostate biopsy that revealed a prostate adenocarcinoma.  He underwent a radical prostatectomy on 1/4/2022 and unfortunately developed a DVT and a saddle embolus for which he was transferred to [facility].  He did eliquis and stopped in 11/22 [de-identified] : He presents for f/up of mild thrombocytopenia, MGUS,thyroid and prostate cancers and saddle embolus in the setting of prostatectomy.\par    He  underwent a thyroidectomy with  9/21\par  PSA elevated and he underwent prostate biopsy that revealed a prostate adenocarcinoma.  He underwent a radical prostatectomy on 1/4/2022 and unfortunately developed a DVT and a saddle embolus for which he was transferred to [facility].  He did eliquis and stopped in 11/22\par  \par

## 2023-09-30 LAB
DEPRECATED KAPPA LC FREE/LAMBDA SER: 0.53 RATIO
IGA SER QL IEP: 176 MG/DL
IGG SER QL IEP: 791 MG/DL
IGM SER QL IEP: 36 MG/DL
KAPPA LC CSF-MCNC: 3.23 MG/DL
KAPPA LC SERPL-MCNC: 1.72 MG/DL

## 2023-11-16 ENCOUNTER — APPOINTMENT (OUTPATIENT)
Dept: HEMATOLOGY ONCOLOGY | Facility: CLINIC | Age: 68
End: 2023-11-16
Payer: COMMERCIAL

## 2023-11-16 ENCOUNTER — RESULT REVIEW (OUTPATIENT)
Age: 68
End: 2023-11-16

## 2023-11-16 VITALS
SYSTOLIC BLOOD PRESSURE: 111 MMHG | RESPIRATION RATE: 16 BRPM | HEART RATE: 66 BPM | HEIGHT: 72 IN | BODY MASS INDEX: 27.5 KG/M2 | TEMPERATURE: 98 F | OXYGEN SATURATION: 99 % | DIASTOLIC BLOOD PRESSURE: 73 MMHG | WEIGHT: 203 LBS

## 2023-11-16 DIAGNOSIS — D72.821 MONOCYTOSIS (SYMPTOMATIC): ICD-10-CM

## 2023-11-16 DIAGNOSIS — D69.6 THROMBOCYTOPENIA, UNSPECIFIED: ICD-10-CM

## 2023-11-16 PROCEDURE — 99213 OFFICE O/P EST LOW 20 MIN: CPT | Mod: 25

## 2023-11-16 PROCEDURE — 36415 COLL VENOUS BLD VENIPUNCTURE: CPT

## 2023-11-18 LAB
DEPRECATED KAPPA LC FREE/LAMBDA SER: 0.61 RATIO
IGA SER QL IEP: 191 MG/DL
IGG SER QL IEP: 809 MG/DL
IGM SER QL IEP: 53 MG/DL
KAPPA LC CSF-MCNC: 3.16 MG/DL
KAPPA LC SERPL-MCNC: 1.92 MG/DL

## 2024-03-13 ENCOUNTER — LABORATORY RESULT (OUTPATIENT)
Age: 69
End: 2024-03-13

## 2024-03-13 ENCOUNTER — APPOINTMENT (OUTPATIENT)
Dept: HEMATOLOGY ONCOLOGY | Facility: CLINIC | Age: 69
End: 2024-03-13
Payer: COMMERCIAL

## 2024-03-13 ENCOUNTER — RESULT REVIEW (OUTPATIENT)
Age: 69
End: 2024-03-13

## 2024-03-13 VITALS
HEART RATE: 67 BPM | RESPIRATION RATE: 18 BRPM | BODY MASS INDEX: 26.14 KG/M2 | DIASTOLIC BLOOD PRESSURE: 80 MMHG | SYSTOLIC BLOOD PRESSURE: 129 MMHG | WEIGHT: 193 LBS | TEMPERATURE: 98.3 F | OXYGEN SATURATION: 96 % | HEIGHT: 72 IN

## 2024-03-13 DIAGNOSIS — C61 MALIGNANT NEOPLASM OF PROSTATE: ICD-10-CM

## 2024-03-13 DIAGNOSIS — C73 MALIGNANT NEOPLASM OF THYROID GLAND: ICD-10-CM

## 2024-03-13 DIAGNOSIS — R76.8 OTHER SPECIFIED ABNORMAL IMMUNOLOGICAL FINDINGS IN SERUM: ICD-10-CM

## 2024-03-13 DIAGNOSIS — I26.92 SADDLE EMBOLUS OF PULMONARY ARTERY W/OUT ACUTE COR PULMONALE: ICD-10-CM

## 2024-03-13 DIAGNOSIS — D68.59 OTHER PRIMARY THROMBOPHILIA: ICD-10-CM

## 2024-03-13 DIAGNOSIS — D47.2 MONOCLONAL GAMMOPATHY: ICD-10-CM

## 2024-03-13 PROCEDURE — 99214 OFFICE O/P EST MOD 30 MIN: CPT

## 2024-03-13 NOTE — HISTORY OF PRESENT ILLNESS
[0 - No Distress] : Distress Level: 0 [de-identified] : Mr. Shafer is a 66 year old male who is referred for initial consultation for thrombocytopenia.  He was first told that his platelets may have been borderline based on blood work from 2007.  Platelet counts in Jan of 2021 were 143,000 and in April 136,000.   He is folllowed for mild thrombocytopenia, MGUS,thyroid and prostate cancers and saddle embolus in the setting of prostatectomy.   He  underwent a thyroidectomy with  9/21 PSA elevated and he underwent prostate biopsy that revealed a prostate adenocarcinoma.  He underwent a radical prostatectomy on 1/4/2022 and unfortunately developed a DVT and a saddle embolus for which he was transferred to [facility].  He did eliquis and stopped in 11/22 [de-identified] : He presents for f/up of mild thrombocytopenia, MGUS,thyroid and prostate cancers and saddle embolus in the setting of prostatectomy.   He  underwent a thyroidectomy with  9/21 PSA elevated and he underwent prostate biopsy that revealed a prostate adenocarcinoma.  He underwent a radical prostatectomy on 1/4/2022 and unfortunately developed a DVT and a saddle embolus for which he was transferred to [facility].  He did eliquis and stopped in 11/22 Colonscopy Jan 2024

## 2024-03-13 NOTE — ASSESSMENT
[FreeTextEntry1] : Mr. Shafer is a 66-year-old male who is referred for initial consultation for thrombocytopenia. He was first told that his platelets may have been borderline based on blood work from 2007. Platelet counts in Jan of 2021 were 143,000 and in April 136,000. He is followed for mild thrombocytopenia, MGUS,thyroid and prostate cancers and saddle embolus in the setting of prostatectomy.  He underwent a thyroidectomy with  9/21 PSA elevated and he underwent prostate biopsy that revealed a prostate adenocarcinoma. He underwent a radical prostatectomy on 1/4/2022 and unfortunately developed a DVT and a saddle embolus for which he was transferred to [facility]. He did eliquis and stopped in 11/22  # Protein S deficiency - 53 % ()  and 49% ( 10/22).  Provoked PE, therefore patient not on AC  # MGUS IgG lambda- under surveillance, no neuropathy, Plt 146K, Cr 0.94,   IgG lambda on MARIO/ gamma migrating paraprotein on SPEP/ Ratio 2.95-3.6.3/23 negative skeletal imaging.  # Prostate cancer- < 0.01, [place] 3+4=7, group 2, pattern 4, T2  # Thyroid cancer  9/21. Papillary ca, T1b  # EDI positive- father with RA, Repeat EDI, patient asymptomatic.  Plan:  Prostate cancer: s/p radical prostatectomy 1/2022 --f/u with Dr. Jaquez for surveillance Thyroid cancer s/p partial thyroidectomy. f/u with Dr. Borjas

## 2024-03-15 LAB
APPEARANCE: ABNORMAL
BILIRUBIN URINE: NEGATIVE
BLOOD URINE: NEGATIVE
COLOR: YELLOW
CREAT SPEC-SCNC: 93 MG/DL
CREAT/PROT UR: 0.1 RATIO
DSDNA AB SER-ACNC: 12 IU/ML
GLUCOSE QUALITATIVE U: NEGATIVE MG/DL
KETONES URINE: NEGATIVE MG/DL
LEUKOCYTE ESTERASE URINE: NEGATIVE
NITRITE URINE: NEGATIVE
PH URINE: 5.5
PROT UR-MCNC: 13 MG/DL
PROT UR-MCNC: 13 MG/DL
PROTEIN URINE: NEGATIVE MG/DL
PSA SERPL-MCNC: <0.01 NG/ML
SPECIFIC GRAVITY URINE: 1.02
UROBILINOGEN URINE: 0.2 MG/DL

## 2024-03-17 LAB
IGA 24H UR QL IFE: NORMAL
KAPPA LC 24H UR QL: NORMAL

## 2024-03-19 LAB
ANA PAT FLD IF-IMP: NORMAL
ANA SER IF-ACNC: ABNORMAL

## 2024-04-12 LAB
ALBUMIN MFR SERPL ELPH: 63.2 %
ALBUMIN SERPL-MCNC: 3.6 G/DL
ALBUMIN/GLOB SERPL: 1.7 RATIO
ALPHA1 GLOB MFR SERPL ELPH: 3.2 %
ALPHA1 GLOB SERPL ELPH-MCNC: 0.2 G/DL
ALPHA2 GLOB MFR SERPL ELPH: 10.4 %
ALPHA2 GLOB SERPL ELPH-MCNC: 0.6 G/DL
B-GLOBULIN MFR SERPL ELPH: 11.3 %
B-GLOBULIN SERPL ELPH-MCNC: 0.6 G/DL
GAMMA GLOB FLD ELPH-MCNC: 0.7 G/DL
GAMMA GLOB MFR SERPL ELPH: 11.9 %
INTERPRETATION SERPL IEP-IMP: NORMAL
M PROTEIN MFR SERPL ELPH: 2.8 %
MONOCLON BAND OBS SERPL: 0.2 G/DL
PROT SERPL-MCNC: 5.7 G/DL
PROT SERPL-MCNC: 5.7 G/DL

## 2024-09-11 ENCOUNTER — LABORATORY RESULT (OUTPATIENT)
Age: 69
End: 2024-09-11

## 2024-09-11 ENCOUNTER — APPOINTMENT (OUTPATIENT)
Dept: HEMATOLOGY ONCOLOGY | Facility: CLINIC | Age: 69
End: 2024-09-11
Payer: COMMERCIAL

## 2024-09-11 ENCOUNTER — RESULT REVIEW (OUTPATIENT)
Age: 69
End: 2024-09-11

## 2024-09-11 VITALS
OXYGEN SATURATION: 97 % | DIASTOLIC BLOOD PRESSURE: 71 MMHG | WEIGHT: 187.9 LBS | RESPIRATION RATE: 18 BRPM | HEIGHT: 72 IN | SYSTOLIC BLOOD PRESSURE: 113 MMHG | TEMPERATURE: 97.8 F | BODY MASS INDEX: 25.45 KG/M2 | HEART RATE: 60 BPM

## 2024-09-11 DIAGNOSIS — C73 MALIGNANT NEOPLASM OF THYROID GLAND: ICD-10-CM

## 2024-09-11 DIAGNOSIS — M54.50 LOW BACK PAIN, UNSPECIFIED: ICD-10-CM

## 2024-09-11 DIAGNOSIS — D47.2 MONOCLONAL GAMMOPATHY: ICD-10-CM

## 2024-09-11 DIAGNOSIS — C61 MALIGNANT NEOPLASM OF PROSTATE: ICD-10-CM

## 2024-09-11 DIAGNOSIS — D68.59 OTHER PRIMARY THROMBOPHILIA: ICD-10-CM

## 2024-09-11 PROCEDURE — 99214 OFFICE O/P EST MOD 30 MIN: CPT

## 2024-09-11 RX ORDER — DAPAGLIFLOZIN 10 MG/1
TABLET, FILM COATED ORAL
Refills: 0 | Status: ACTIVE | COMMUNITY

## 2024-09-11 NOTE — HISTORY OF PRESENT ILLNESS
[0 - No Distress] : Distress Level: 0 [de-identified] : Mr. Shafer is a 66 year old male who is referred for initial consultation for thrombocytopenia.  He was first told that his platelets may have been borderline based on blood work from 2007.  Platelet counts in Jan of 2021 were 143,000 and in April 136,000.   He is folllowed for mild thrombocytopenia, MGUS,thyroid and prostate cancers and saddle embolus in the setting of prostatectomy.   He  underwent a thyroidectomy with  9/21 PSA elevated and he underwent prostate biopsy that revealed a prostate adenocarcinoma.  He underwent a radical prostatectomy on 1/4/2022 and unfortunately developed a DVT and a saddle embolus for which he was transferred to [facility].  He did eliquis and stopped in 11/22 [de-identified] : He presents for f/up of mild thrombocytopenia, MGUS,thyroid, prostate cancers and saddle embolus in the setting of prostatectomy. Presents his wife Kate. Feels overall well.  New pain from Right buttock/ hip area down leg for 3-4 weeks. Being followed by PMD Dr. Romo.  US BLLE-negative. Lower back xray-awaiting those results   Colonscopy Jan 2024

## 2024-09-11 NOTE — HISTORY OF PRESENT ILLNESS
[0 - No Distress] : Distress Level: 0 [de-identified] : Mr. Shafer is a 66 year old male who is referred for initial consultation for thrombocytopenia.  He was first told that his platelets may have been borderline based on blood work from 2007.  Platelet counts in Jan of 2021 were 143,000 and in April 136,000.   He is folllowed for mild thrombocytopenia, MGUS,thyroid and prostate cancers and saddle embolus in the setting of prostatectomy.   He  underwent a thyroidectomy with  9/21 PSA elevated and he underwent prostate biopsy that revealed a prostate adenocarcinoma.  He underwent a radical prostatectomy on 1/4/2022 and unfortunately developed a DVT and a saddle embolus for which he was transferred to [facility].  He did eliquis and stopped in 11/22 [de-identified] : He presents for f/up of mild thrombocytopenia, MGUS,thyroid, prostate cancers and saddle embolus in the setting of prostatectomy. Presents his wife Kate. Feels overall well.  New pain from Right buttock/ hip area down leg for 3-4 weeks. Being followed by PMD Dr. Romo.  US BLLE-negative. Lower back xray-awaiting those results   Colonscopy Jan 2024

## 2024-09-11 NOTE — ASSESSMENT
[FreeTextEntry1] : Mr. Shafer is a 68-year-old male who is referred for initial consultation for thrombocytopenia. He was first told that his platelets may have been borderline based on blood work from 2007. Platelet counts in Jan of 2021 were 143,000 and in April 136,000. He is followed for mild thrombocytopenia, MGUS,thyroid and prostate cancers and saddle embolus in the setting of prostatectomy.  He underwent a thyroidectomy with  9/21 PSA elevated and he underwent prostate biopsy that revealed a prostate adenocarcinoma. He underwent a radical prostatectomy on 1/4/2022 and unfortunately developed a DVT and a saddle embolus for which he was transferred to [facility]. He did eliquis and stopped in 11/22  # Protein S deficiency - 53 % ()  and 49% ( 10/22).  Provoked PE, therefore patient not on AC  # MGUS IgG lambda- under surveillance, no neuropathy, Plt 146K, Cr 0.94,   IgG lambda on MARIO/ gamma migrating paraprotein on SPEP/ Ratio 2.95-3.6.3/23 negative skeletal imaging.  # Prostate cancer- < 0.01, [place] 3+4=7, group 2, pattern 4, T2  # Thyroid cancer  9/21. Papillary ca, T1b  # EDI positive- father with RA, Repeat EDI, patient asymptomatic.  Plan: MGUS labs Prostate cancer: s/p radical prostatectomy 1/2022 --f/u with Dr. Jaquez for surveillance Thyroid cancer s/p partial thyroidectomy. f/u with Dr. Borjas  # Back pain  - 7/10 - intermittent, goes to chiropractor - cannot have MRI ( metal plate )  - referred to pain management

## 2024-09-11 NOTE — ASSESSMENT
[FreeTextEntry1] : Mr. Shafer is a 68-year-old male who is referred for initial consultation for thrombocytopenia. He was first told that his platelets may have been borderline based on blood work from 2007. Platelet counts in Jan of 2021 were 143,000 and in April 136,000. He is followed for mild thrombocytopenia, MGUS,thyroid and prostate cancers and saddle embolus in the setting of prostatectomy.  He underwent a thyroidectomy with  9/21 PSA elevated and he underwent prostate biopsy that revealed a prostate adenocarcinoma. He underwent a radical prostatectomy on 1/4/2022 and unfortunately developed a DVT and a saddle embolus for which he was transferred to [facility]. He did eliquis and stopped in 11/22  # Protein S deficiency - 53 % ()  and 49% ( 10/22).  Provoked PE, therefore patient not on AC  # MGUS IgG lambda- under surveillance, no neuropathy, Plt 146K, Cr 0.94,   IgG lambda on MARIO/ gamma migrating paraprotein on SPEP/ Ratio 2.95-3.6.3/23 negative skeletal imaging.  # Prostate cancer- < 0.01, [place] 3+4=7, group 2, pattern 4, T2  # Thyroid cancer  9/21. Papillary ca, T1b  # EDI positive- father with RA, Repeat EDI, patient asymptomatic.  Plan: MGUS labs Prostate cancer: s/p radical prostatectomy 1/2022 --f/u with Dr. Jaquez for surveillance Thyroid cancer s/p partial thyroidectomy. f/u with Dr. Borjas  # Back pain  - 7/10 - intermittent, goes to chiropractor - cannot have MRI ( metal plate )  - referred to pain management

## 2024-09-11 NOTE — BEGINNING OF VISIT
[0] : 2) Feeling down, depressed, or hopeless: Not at all (0) [PHQ-2 Negative] : PHQ-2 Negative [Pain Scale: ___] : On a scale of 1-10, today the patient's pain is a(n) [unfilled]. [20 or more] : 20 or more [> 15 Years] : > 15 Years [Date Discussed (MM/DD/YY): ___] : Discussed: [unfilled] [With Patient/Caregiver] : with Patient/Caregiver [[id number]] : 0 [Future Reassessment of Pain Scale] : Future reassessment of pain scale [Referred to Palliative/Pain Management] : Referred to Palliative/Pain Management [Former] : Former

## 2024-09-11 NOTE — ASSESSMENT
[FreeTextEntry1] : Mr. Shafer is a 68-year-old male who is referred for initial consultation for thrombocytopenia. He was first told that his platelets may have been borderline based on blood work from 2007. Platelet counts in Jan of 2021 were 143,000 and in April 136,000. He is followed for mild thrombocytopenia, MGUS,thyroid and prostate cancers and saddle embolus in the setting of prostatectomy.  He underwent a thyroidectomy with  9/21 PSA elevated and he underwent prostate biopsy that revealed a prostate adenocarcinoma. He underwent a radical prostatectomy on 1/4/2022 and unfortunately developed a DVT and a saddle embolus for which he was transferred to [facility]. He did eliquis and stopped in 11/22  # Protein S deficiency - 53 % ()  and 49% ( 10/22).  Provoked PE, therefore patient not on AC  # MGUS IgG lambda- under surveillance, no neuropathy, Plt 146K, Cr 0.94,   IgG lambda on MARIO/ gamma migrating paraprotein on SPEP/ Ratio 2.95-3.6.3/23 negative skeletal imaging.  # Prostate cancer- < 0.01, [place] 3+4=7, group 2, pattern 4, T2  # Thyroid cancer  9/21. Papillary ca, T1b  # EDI positive- father with RA, Repeat EDI, patient asymptomatic.  Plan: MGUS labs Prostate cancer: s/p radical prostatectomy 1/2022 --f/u with Dr. Jaquez for surveillance Thyroid cancer s/p partial thyroidectomy. f/u with Dr. Borjas  # Back pain  - 7/10 - intermittent, goes to chiropractor - cannot have MRI ( metal plate )  - referred to pain management

## 2024-09-11 NOTE — BEGINNING OF VISIT
[0] : 2) Feeling down, depressed, or hopeless: Not at all (0) [PHQ-2 Negative] : PHQ-2 Negative [Pain Scale: ___] : On a scale of 1-10, today the patient's pain is a(n) [unfilled]. [20 or more] : 20 or more [> 15 Years] : > 15 Years [Date Discussed (MM/DD/YY): ___] : Discussed: [unfilled] [With Patient/Caregiver] : with Patient/Caregiver [[id number]] : 0 [Future Reassessment of Pain Scale] : Future reassessment of pain scale [Referred to Palliative/Pain Management] : Referred to Palliative/Pain Management [Former] : Former

## 2024-09-11 NOTE — BEGINNING OF VISIT
[0] : 2) Feeling down, depressed, or hopeless: Not at all (0) [PHQ-2 Negative] : PHQ-2 Negative [Pain Scale: ___] : On a scale of 1-10, today the patient's pain is a(n) [unfilled]. [20 or more] : 20 or more [> 15 Years] : > 15 Years [Date Discussed (MM/DD/YY): ___] : Discussed: [unfilled] [With Patient/Caregiver] : with Patient/Caregiver [[id number]] : 0 [Future Reassessment of Pain Scale] : Future reassessment of pain scale [Referred to Palliative/Pain Management] : Referred to Palliative/Pain Management [Former] : Former

## 2024-09-11 NOTE — HISTORY OF PRESENT ILLNESS
[0 - No Distress] : Distress Level: 0 [de-identified] : Mr. Shafer is a 66 year old male who is referred for initial consultation for thrombocytopenia.  He was first told that his platelets may have been borderline based on blood work from 2007.  Platelet counts in Jan of 2021 were 143,000 and in April 136,000.   He is folllowed for mild thrombocytopenia, MGUS,thyroid and prostate cancers and saddle embolus in the setting of prostatectomy.   He  underwent a thyroidectomy with  9/21 PSA elevated and he underwent prostate biopsy that revealed a prostate adenocarcinoma.  He underwent a radical prostatectomy on 1/4/2022 and unfortunately developed a DVT and a saddle embolus for which he was transferred to [facility].  He did eliquis and stopped in 11/22 [de-identified] : He presents for f/up of mild thrombocytopenia, MGUS,thyroid, prostate cancers and saddle embolus in the setting of prostatectomy. Presents his wife Kate. Feels overall well.  New pain from Right buttock/ hip area down leg for 3-4 weeks. Being followed by PMD Dr. Romo.  US BLLE-negative. Lower back xray-awaiting those results   Colonscopy Jan 2024

## 2024-12-25 PROBLEM — F10.90 ALCOHOL USE: Status: ACTIVE | Noted: 2021-05-10

## 2025-02-24 NOTE — REASON FOR VISIT
[Initial Consultation] : an initial consultation for [FreeTextEntry2] : Thrombocytopenia 19-Jul-2023 No

## 2025-03-12 ENCOUNTER — RESULT REVIEW (OUTPATIENT)
Age: 70
End: 2025-03-12

## 2025-03-12 ENCOUNTER — APPOINTMENT (OUTPATIENT)
Dept: HEMATOLOGY ONCOLOGY | Facility: CLINIC | Age: 70
End: 2025-03-12
Payer: MEDICARE

## 2025-03-12 VITALS
OXYGEN SATURATION: 98 % | HEART RATE: 56 BPM | WEIGHT: 187.3 LBS | BODY MASS INDEX: 25.37 KG/M2 | HEIGHT: 72 IN | TEMPERATURE: 97.4 F | RESPIRATION RATE: 18 BRPM | SYSTOLIC BLOOD PRESSURE: 126 MMHG | DIASTOLIC BLOOD PRESSURE: 76 MMHG

## 2025-03-12 DIAGNOSIS — I26.92 SADDLE EMBOLUS OF PULMONARY ARTERY W/OUT ACUTE COR PULMONALE: ICD-10-CM

## 2025-03-12 DIAGNOSIS — D68.59 OTHER PRIMARY THROMBOPHILIA: ICD-10-CM

## 2025-03-12 DIAGNOSIS — D69.6 THROMBOCYTOPENIA, UNSPECIFIED: ICD-10-CM

## 2025-03-12 DIAGNOSIS — C61 MALIGNANT NEOPLASM OF PROSTATE: ICD-10-CM

## 2025-03-12 PROCEDURE — 99204 OFFICE O/P NEW MOD 45 MIN: CPT

## 2025-03-12 RX ORDER — DAPAGLIFLOZIN 10 MG/1
10 TABLET, FILM COATED ORAL
Refills: 0 | Status: ACTIVE | COMMUNITY

## 2025-03-12 RX ORDER — MAGNESIUM OXIDE/MAG AA CHELATE 300 MG
CAPSULE ORAL
Refills: 0 | Status: ACTIVE | COMMUNITY